# Patient Record
Sex: FEMALE | Race: BLACK OR AFRICAN AMERICAN | NOT HISPANIC OR LATINO | URBAN - METROPOLITAN AREA
[De-identification: names, ages, dates, MRNs, and addresses within clinical notes are randomized per-mention and may not be internally consistent; named-entity substitution may affect disease eponyms.]

---

## 2017-02-01 ENCOUNTER — OFFICE VISIT (OUTPATIENT)
Dept: OBSTETRICS AND GYNECOLOGY | Facility: CLINIC | Age: 62
End: 2017-02-01

## 2017-02-01 VITALS
WEIGHT: 123 LBS | HEIGHT: 63 IN | DIASTOLIC BLOOD PRESSURE: 80 MMHG | SYSTOLIC BLOOD PRESSURE: 118 MMHG | BODY MASS INDEX: 21.79 KG/M2

## 2017-02-01 DIAGNOSIS — Z12.4 PAP SMEAR FOR CERVICAL CANCER SCREENING: ICD-10-CM

## 2017-02-01 DIAGNOSIS — Z12.31 SCREENING MAMMOGRAM, ENCOUNTER FOR: ICD-10-CM

## 2017-02-01 DIAGNOSIS — Z13.9 SCREENING: Primary | ICD-10-CM

## 2017-02-01 PROBLEM — Z01.419 WELL FEMALE EXAM WITH ROUTINE GYNECOLOGICAL EXAM: Status: ACTIVE | Noted: 2017-02-01

## 2017-02-01 PROBLEM — N18.6 END-STAGE RENAL DISEASE (HCC): Status: ACTIVE | Noted: 2017-02-01

## 2017-02-01 PROCEDURE — G0101 CA SCREEN;PELVIC/BREAST EXAM: HCPCS | Performed by: OBSTETRICS & GYNECOLOGY

## 2017-02-01 RX ORDER — GLUCOSAMINE HCL 500 MG
2000 TABLET ORAL
COMMUNITY
End: 2017-02-01 | Stop reason: DRUGHIGH

## 2017-02-01 RX ORDER — AMLODIPINE BESYLATE 5 MG/1
TABLET ORAL
Refills: 5 | COMMUNITY
Start: 2016-12-10 | End: 2017-02-01 | Stop reason: DRUGHIGH

## 2017-02-01 RX ORDER — TERAZOSIN 1 MG/1
CAPSULE ORAL
COMMUNITY
Start: 2016-11-20 | End: 2017-02-01 | Stop reason: SDUPTHER

## 2017-02-01 RX ORDER — SERTRALINE HYDROCHLORIDE 25 MG/1
25 TABLET, FILM COATED ORAL
COMMUNITY
End: 2017-02-01 | Stop reason: SDUPTHER

## 2017-02-01 RX ORDER — HYDRALAZINE HYDROCHLORIDE 50 MG/1
50 TABLET, FILM COATED ORAL
COMMUNITY
End: 2019-04-11

## 2017-02-01 RX ORDER — PREDNISONE 10 MG/1
TABLET ORAL
COMMUNITY
Start: 2016-12-10 | End: 2017-02-01

## 2017-02-01 RX ORDER — NITROGLYCERIN 0.4 MG/1
TABLET SUBLINGUAL
COMMUNITY
End: 2017-02-01 | Stop reason: SDUPTHER

## 2017-02-01 RX ORDER — LISINOPRIL 40 MG/1
TABLET ORAL
COMMUNITY
Start: 2017-01-27 | End: 2022-09-21

## 2017-02-01 RX ORDER — IPRATROPIUM BROMIDE AND ALBUTEROL SULFATE 2.5; .5 MG/3ML; MG/3ML
3 SOLUTION RESPIRATORY (INHALATION)
COMMUNITY
Start: 2015-09-14

## 2017-02-01 RX ORDER — ROPINIROLE 0.5 MG/1
0.5 TABLET, FILM COATED ORAL
COMMUNITY
End: 2017-02-01

## 2017-02-01 RX ORDER — DEXAMETHASONE 2 MG/1
TABLET ORAL
COMMUNITY
Start: 2016-10-18 | End: 2019-04-11

## 2017-02-01 RX ORDER — LEVOTHYROXINE SODIUM 0.03 MG/1
25 TABLET ORAL
COMMUNITY
End: 2018-05-22

## 2017-02-01 RX ORDER — RENO CAPS 100; 1.5; 1.7; 20; 10; 1; 150; 5; 6 MG/1; MG/1; MG/1; MG/1; MG/1; MG/1; UG/1; MG/1; UG/1
CAPSULE ORAL
COMMUNITY
End: 2017-02-01 | Stop reason: SDUPTHER

## 2017-02-01 RX ORDER — CARVEDILOL 25 MG/1
TABLET ORAL
COMMUNITY
End: 2017-02-01 | Stop reason: SDUPTHER

## 2017-02-01 RX ORDER — ISOSORBIDE MONONITRATE 30 MG/1
30 TABLET, EXTENDED RELEASE ORAL
COMMUNITY
End: 2021-12-06

## 2017-02-01 RX ORDER — LOSARTAN POTASSIUM 25 MG/1
TABLET ORAL
COMMUNITY
Start: 2016-12-28

## 2017-02-01 RX ORDER — BUDESONIDE AND FORMOTEROL FUMARATE DIHYDRATE 160; 4.5 UG/1; UG/1
AEROSOL RESPIRATORY (INHALATION)
COMMUNITY
Start: 2016-11-28

## 2017-02-01 RX ORDER — LIDOCAINE AND PRILOCAINE 25; 25 MG/G; MG/G
CREAM TOPICAL
Refills: 5 | COMMUNITY
Start: 2017-01-16 | End: 2019-04-11

## 2017-02-01 RX ORDER — LOSARTAN POTASSIUM 50 MG/1
TABLET ORAL
Refills: 5 | COMMUNITY
Start: 2017-01-08 | End: 2017-02-01 | Stop reason: DRUGHIGH

## 2017-02-01 RX ORDER — CALCIUM ACETATE 667 MG/1
TABLET ORAL
COMMUNITY
End: 2017-02-01 | Stop reason: SDUPTHER

## 2017-02-01 RX ORDER — BUMETANIDE 2 MG/1
TABLET ORAL
COMMUNITY
End: 2017-02-01 | Stop reason: SDUPTHER

## 2017-02-01 RX ORDER — CLONIDINE HYDROCHLORIDE 0.2 MG/1
0.2 TABLET ORAL
COMMUNITY
End: 2017-02-01 | Stop reason: SDUPTHER

## 2017-02-01 RX ORDER — ONDANSETRON 4 MG/1
TABLET, FILM COATED ORAL
Refills: 0 | COMMUNITY
Start: 2016-12-13

## 2017-02-01 RX ORDER — LISINOPRIL 10 MG/1
TABLET ORAL
COMMUNITY
Start: 2016-10-04 | End: 2017-02-01 | Stop reason: DRUGHIGH

## 2017-02-01 RX ORDER — HYDRALAZINE HYDROCHLORIDE 100 MG/1
TABLET, FILM COATED ORAL
COMMUNITY
Start: 2017-01-24 | End: 2017-02-01 | Stop reason: SDUPTHER

## 2017-02-01 RX ORDER — AMLODIPINE BESYLATE 10 MG/1
10 TABLET ORAL
COMMUNITY
Start: 2015-09-14

## 2017-02-01 RX ORDER — ALBUTEROL SULFATE 2.5 MG/3ML
2.5 SOLUTION RESPIRATORY (INHALATION)
COMMUNITY
End: 2017-02-01 | Stop reason: SDUPTHER

## 2017-02-01 RX ORDER — MONTELUKAST SODIUM 10 MG/1
10 TABLET ORAL
COMMUNITY
End: 2017-02-01 | Stop reason: SDUPTHER

## 2017-02-01 NOTE — PROGRESS NOTES
[facility]  Routine Annual Visit    2017    Patient: Fanta Askew          MR#:[id number]      History of Present Illness    61 y.o. female  who presents for annual exam.    Patient presents for routine gynecologic exam.  She states she was seen by [facility] in [place] early January on  and had her Pap smear done.  She doesn't believe a breast exam was done and she's needing a mammogram scheduled.    Records were requested but not available at the time of this encounter.      No LMP recorded (exact date). Patient is postmenopausal.  Obstetric History:  OB History      Para Term  AB TAB SAB Ectopic Multiple Living    2 2 2       1         Menstrual History:  Menarche age: 22 years  No LMP recorded (exact date). Patient is postmenopausal.  Period Cycle (Days): 30  Period Duration (Days): 3  Period Pattern: Regular  Menstrual Flow: Light  Menstrual Control: Maxi pad    Sexual History:       ________________________________________  Patient Active Problem List   Diagnosis   • Hypothyroid   • Hypertension   • Renal disorder   • COPD (chronic obstructive pulmonary disease)   • CHF (congestive heart failure)       Past Medical History   Diagnosis Date   • CHF (congestive heart failure)    • COPD (chronic obstructive pulmonary disease)    • Hypertension    • Hypothyroid    • Renal disorder        History reviewed. No pertinent past surgical history.    History   Smoking Status   • Former Smoker   Smokeless Tobacco   • Not on file       Family History   Problem Relation Age of Onset   • Pancreatic cancer Father 81   • Lung cancer Mother 76       Prior to Admission medications    Medication Sig Start Date End Date Taking? Authorizing Provider   albuterol (PROVENTIL) (2.5 MG/3ML) 0.083% nebulizer solution Take 2.5 mg by nebulization Every 4 (Four) Hours As Needed for wheezing.   Yes Nurse [facility], RN   amLODIPine (NORVASC) 10 MG tablet Take 10 mg by mouth. 9/14/15   Yes Historical Provider, MD   amLODIPine-benazepril (LOTREL) 10-20 MG per capsule Take 1 capsule by mouth daily.   Yes Nurse [facility], RN   aspirin 81 MG tablet Take 81 mg by mouth.   Yes Nurse [facility], RN   B Complex-C-Folic Acid (VASILIY CAPS) 1 MG capsule Take  by mouth.   Yes [facility], RN   budesonide-formoterol (SYMBICORT) 160-4.5 MCG/ACT inhaler INHALE 2 PUFFS BY MOUTH 2 TIMES PER DAY 11/28/16  Yes Historical Provider, MD   bumetanide (BUMEX) 2 MG tablet Take 2 mg by mouth daily.   Yes Nurse [facility], RN   calcium acetate (PHOSLO) 667 MG tablet Take 667 mg by mouth.   Yes Nurse [facility], RN   carvedilol (COREG) 25 MG tablet Take 25 mg by mouth 2 (two) times a day with meals.   Yes Nurse [facility], RN   Cholecalciferol (VITAMIN D3) 2000 UNITS capsule TAKE 1 CAPSULE BY MOUTH DAILY 12/5/16  Yes Nurse [facility], RN   CloNIDine (CATAPRES) 0.2 MG tablet TAKE 1 TABLET BY MOUTH 3 TIMES A DAY 11/16/16  Yes Nurse [facility], RN   dexamethasone (DECADRON) 2 MG tablet 2 mg oral BID x 2 days then 2 mg oral daily x 2 days then stop 10/18/16  Yes Historical Provider, MD   fluticasone (FLONASE) 50 MCG/ACT nasal spray  12/5/16  Yes Nurse [facility], RN   hydrALAZINE (APRESOLINE) 50 MG tablet Take 50 mg by mouth.   Yes Historical Provider, MD   ipratropium-albuterol (DUO-NEB) 0.5-2.5 mg/mL nebulizer Inhale 3 mL. 9/14/15  Yes Historical Provider, MD   levothyroxine (SYNTHROID, LEVOTHROID) 25 MCG tablet Take 25 mcg by mouth.   Yes Historical Provider, MD   levothyroxine (SYNTHROID, LEVOTHROID) 50 MCG tablet Take 50 mcg by mouth daily.   Yes Nurse [facility], RN   lidocaine-prilocaine (EMLA) 2.5-2.5 % cream APPLY SMALL AMOUNT TO ACCESS SITE 1 TO 2 HOURS BEFORE DIALYSIS. COVER WITH OCCLUSIVE DRESSING (SARAN WRAP). 1/16/17  Yes Historical Provider, MD   lisinopril (PRINIVIL,ZESTRIL) 40 MG tablet  1/27/17  Yes Historical Provider, MD   losartan (COZAAR) 25 MG tablet  12/28/16   Yes Historical Provider, MD   montelukast (SINGULAIR) 10 MG tablet Take 10 mg by mouth every night.   Yes Nurse [facility], RN   nitroglycerin (NITROSTAT) 0.4 MG SL tablet Place 0.4 mg under the tongue every 5 (five) minutes as needed for chest pain. Take no more than 3 doses in 15 minutes.   Yes Nurse [facility], RN   ondansetron (ZOFRAN) 4 MG tablet TAKE 1 TABLET BY MOUTH EVERY 4 HOURS AS NEEDED FOR NAUSEA AND VOMITING 12/13/16  Yes Historical Provider, MD   SYMBICORT 160-4.5 MCG/ACT inhaler INHALE 2 PUFFS BY MOUTH 2 TIMES PER DAY 11/28/16  Yes Nurse [facility], RN   terazosin (HYTRIN) 1 MG capsule TAKE 1 CAPSULE BY MOUTH AT BEDTIME NIGHTLY 11/20/16  Yes Nurse [facility], RN   isosorbide mononitrate (IMDUR) 30 MG 24 hr tablet Take 30 mg by mouth.    Historical Provider, MD   sertraline (ZOLOFT) 25 MG tablet Take 25 mg by mouth daily.    Nurse [facility], RN   albuterol (PROVENTIL) (2.5 MG/3ML) 0.083% nebulizer solution Inhale 2.5 mg.  2/1/17  Historical Provider, MD   amLODIPine (NORVASC) 5 MG tablet TAKE 1 TABLET BY MOUTH EVERY DAY 12/10/16 2/1/17  Historical Provider, MD   aspirin 81 MG tablet Take 81 mg by mouth.  2/1/17  Historical Provider, MD   b complex-C-folic acid 1 MG capsule capsule Take  by mouth.  2/1/17  Historical Provider, MD   bumetanide (BUMEX) 2 MG tablet Take  by mouth.  2/1/17  Historical Provider, MD   calcium acetate (PHOSLO) 667 MG tablet Take  by mouth.  2/1/17  Historical Provider, MD   carvedilol (COREG) 25 MG tablet Take  by mouth.  2/1/17  Historical Provider, MD   Cholecalciferol (VITAMIN D3) 3000 UNITS tablet Take 2,000 Units by mouth.  2/1/17  Historical Provider, MD   CloNIDine (CATAPRES) 0.2 MG tablet Take 0.2 mg by mouth.  2/1/17  Historical Provider, MD   hydrALAZINE (APRESOLINE) 100 MG tablet  1/24/17 2/1/17  Historical Provider, MD   hydrALAZINE (APRESOLINE) 25 MG tablet Take 25 mg by mouth 3 (three) times a day.  2/1/17  Nurse [facility], RN    ipratropium (ATROVENT) 0.02 % nebulizer solution Inhale.  2/1/17  Historical Provider, MD   IPRATROPIUM BROMIDE IN Inhale.  2/1/17  Nurse [facility], RN   levoFLOXacin (LEVAQUIN) 750 MG tablet Take 1 tablet by mouth Daily. 12/10/16 2/1/17  HANG Botello   lisinopril (PRINIVIL,ZESTRIL) 10 MG tablet TAKE ONE TABLET EVERY NIGHT AT BED TIME 10/4/16 2/1/17  Nurse [facility], RN   lisinopril (PRINIVIL,ZESTRIL) 10 MG tablet TAKE ONE TABLET EVERY NIGHT AT BED TIME 10/4/16 2/1/17  Historical Provider, MD   Loratadine 10 MG capsule Take  by mouth.  2/1/17  Nurse [facility], RN   losartan (COZAAR) 50 MG tablet TAKE ONE TABLET BY MOUTH AT BEDTIME 1/8/17 2/1/17  Historical Provider, MD   montelukast (SINGULAIR) 10 MG tablet Take 10 mg by mouth.  2/1/17  Historical Provider, MD   nitroglycerin (NITROSTAT) 0.4 MG SL tablet Place  under the tongue.  2/1/17  Historical Provider, MD   PredniSONE (DELTASONE) 10 MG (48) tablet pack Take as directed on pack 12/10/16 2/1/17  HANG Botello   PredniSONE (DELTASONE) 10 MG (48) tablet pack Take as directed on pack 12/10/16 2/1/17  Historical Provider, MD   Pseudoephedrine-Acetaminophen (SM NON-ASPRIN SINUS PO) Take 81 mg by mouth.  2/1/17  Nurse [facility], RN   rOPINIRole (REQUIP) 0.5 MG tablet Take 0.5 mg by mouth every night. Take 1 hour before bedtime.  2/1/17  Nurse [facility], RN   rOPINIRole (REQUIP) 0.5 MG tablet Take 0.5 mg by mouth.  2/1/17  Historical Provider, MD   sertraline (ZOLOFT) 25 MG tablet Take 25 mg by mouth.  2/1/17  Historical Provider, MD   SPIRIVA RESPIMAT 2.5 MCG/ACT aerosol solution USE 1 PUFF DAILY 11/27/16 2/1/17  Nurse [facility], RN   terazosin (HYTRIN) 1 MG capsule TAKE 1 CAPSULE BY MOUTH AT BEDTIME NIGHTLY 11/20/16 2/1/17  Historical Provider, MD   Tiotropium Bromide Monohydrate (SPIRIVA RESPIMAT) 2.5 MCG/ACT aerosol solution USE 1 PUFF DAILY 11/27/16 2/1/17  Historical Provider, MD  "    ________________________________________    Current contraception: post menopausal status  History of abnormal Pap smear: no  Family history of uterine or ovarian cancer: no  Family History of colon cancer/colon polyps: no  History of abnormal mammogram: no  History of abnormal lipids: no    The following portions of the patient's history were reviewed and updated as appropriate: allergies, current medications, past family history, past medical history, past social history, past surgical history and problem list.    Review of Systems    Pertinent items are noted in HPI.     Objective   Physical Exam    Visit Vitals   • /80   • Ht 63\" (160 cm)   • Wt 123 lb (55.8 kg)   • LMP  (Exact Date)   • Breastfeeding No   • BMI 21.79 kg/m2      BP Readings from Last 3 Encounters:   02/01/17 118/80   12/29/16 (!) 178/102   12/10/16 142/82      Wt Readings from Last 3 Encounters:   02/01/17 123 lb (55.8 kg)   12/10/16 127 lb (57.6 kg)   03/14/16 130 lb (59 kg)      BMI: Estimated body mass index is 21.79 kg/(m^2) as calculated from the following:    Height as of this encounter: 63\" (160 cm).    Weight as of this encounter: 123 lb (55.8 kg).      General:   alert, appears stated age and cooperative   Heart: regular rate and rhythm, S1, S2 normal, no murmur, click, rub or gallop   Lungs: clear to auscultation bilaterally   Abdomen: soft, non-tender, without masses or organomegaly   Breast: inspection negative, no nipple discharge or bleeding, no masses or nodularity palpable   Vulva: Deferred   Vagina: deferred    Cervix: Deferred    Uterus: Deferred   Adnexa: Deferred     Patient refused pelvic exam.      Assessment:    1. Breast check  2.  Hypertension  3.  End-stage renal disease receiving dialysis  4.  History of cocaine abuse reportedly abstinent     Plan:    [x]  Records requested  [x]  Mammogram request made  []  PAP done  []  Occult fecal blood test ([facility])  []  Labs:   []  GC/Chl/TV  []  DEXA scan   []  Referral " for colonoscopy:   []  Encouraged/discussed daily ASA         Blaine Ace MD  2/1/2017 11:07 AM

## 2017-03-10 ENCOUNTER — TELEPHONE (OUTPATIENT)
Dept: OBSTETRICS AND GYNECOLOGY | Facility: CLINIC | Age: 62
End: 2017-03-10

## 2017-03-10 ENCOUNTER — DOCUMENTATION (OUTPATIENT)
Dept: OBSTETRICS AND GYNECOLOGY | Facility: CLINIC | Age: 62
End: 2017-03-10

## 2017-03-10 NOTE — TELEPHONE ENCOUNTER
3/10/2017      Patient: Fanta Askew   MR#:[id number]     Chart note     Call pt: Normal screening mammogram was reviewed.           Blaine Ace MD   3/10/2017 12:32 AM

## 2017-03-10 NOTE — PROGRESS NOTES
[facility]     3/10/2017      Patient:  Fanta Askew   MR#:[id number]    Chart note    Call pt:  Normal screening mammogram was reviewed.        Blaine Ace MD   3/10/2017 12:32 AM

## 2018-08-29 ENCOUNTER — OFFICE VISIT (OUTPATIENT)
Dept: GASTROENTEROLOGY | Facility: CLINIC | Age: 63
End: 2018-08-29

## 2018-08-29 ENCOUNTER — TELEPHONE (OUTPATIENT)
Dept: GASTROENTEROLOGY | Facility: CLINIC | Age: 63
End: 2018-08-29

## 2018-08-29 VITALS — HEIGHT: 62 IN | BODY MASS INDEX: 19.65 KG/M2 | TEMPERATURE: 98.6 F | WEIGHT: 106.8 LBS

## 2018-08-29 DIAGNOSIS — D64.9 ANEMIA, UNSPECIFIED TYPE: Primary | ICD-10-CM

## 2018-08-29 PROCEDURE — 99203 OFFICE O/P NEW LOW 30 MIN: CPT | Performed by: INTERNAL MEDICINE

## 2018-08-29 NOTE — TELEPHONE ENCOUNTER
----- Message from Rod Pate MD sent at 8/29/2018 11:26 AM EDT -----  Please request colonoscopy report from [facility] (should be in last year or two)

## 2018-08-29 NOTE — TELEPHONE ENCOUNTER
Called [facility] and spoke with medical records. Requested c/s and path results be faxed to [phone]. Records will be faxed ASAP.

## 2018-08-29 NOTE — PROGRESS NOTES
Chief Complaint   Patient presents with   • Anemia     Subjective      HPI     Fanta Askew is a 63 y.o. female who presents for evaluation of anemia. Pt with multiple medical problems, including HTN, COPD, CHF, ESRD on HD.  Reports Hb earlier this year in upper 7s.  Workup by her nephrologist including FOBT was negative.  MCV elevated.  Pt has received IV iron on dialysis.  Hb has been steadily increasing, now upper 9s.  She has not required transfusion.  She denies overt GI bleeding.  She has no GI complaints.  She reports normal colonoscopy 1yr ago at [facility].  I have requested those records.        Past Medical History:   Diagnosis Date   • CHF (congestive heart failure) (CMS/HCC)    • COPD (chronic obstructive pulmonary disease) (CMS/[facility])    • Hypertension    • Hypothyroid    • Renal disorder        Current Outpatient Prescriptions:   •  albuterol (PROVENTIL) (2.5 MG/3ML) 0.083% nebulizer solution, Take 2.5 mg by nebulization Every 4 (Four) Hours As Needed for wheezing., Disp: , Rfl:   •  amLODIPine (NORVASC) 10 MG tablet, Take 10 mg by mouth., Disp: , Rfl:   •  aspirin 81 MG tablet, Take 81 mg by mouth., Disp: , Rfl:   •  B Complex-C-Folic Acid (VASILIY CAPS) 1 MG capsule, Take  by mouth., Disp: , Rfl:   •  budesonide-formoterol (SYMBICORT) 160-4.5 MCG/ACT inhaler, INHALE 2 PUFFS BY MOUTH 2 TIMES PER DAY, Disp: , Rfl:   •  bumetanide (BUMEX) 2 MG tablet, Take 2 mg by mouth daily., Disp: , Rfl:   •  calcium acetate (PHOSLO) 667 MG tablet, Take 667 mg by mouth., Disp: , Rfl:   •  carvedilol (COREG) 25 MG tablet, Take 25 mg by mouth 2 (two) times a day with meals., Disp: , Rfl:   •  Cholecalciferol (VITAMIN D3) 2000 UNITS capsule, TAKE 1 CAPSULE BY MOUTH DAILY, Disp: , Rfl: 6  •  CloNIDine (CATAPRES) 0.2 MG tablet, TAKE 1 TABLET BY MOUTH 3 TIMES A DAY, Disp: , Rfl: 3  •  dexamethasone (DECADRON) 2 MG tablet, 2 mg oral BID x 2 days then 2 mg oral daily x 2 days then stop, Disp: , Rfl:   •  fluticasone  (FLONASE) 50 MCG/ACT nasal spray, , Disp: , Rfl:   •  hydrALAZINE (APRESOLINE) 50 MG tablet, Take 50 mg by mouth., Disp: , Rfl:   •  ipratropium-albuterol (DUO-NEB) 0.5-2.5 mg/mL nebulizer, Inhale 3 mL., Disp: , Rfl:   •  isosorbide mononitrate (IMDUR) 30 MG 24 hr tablet, Take 30 mg by mouth., Disp: , Rfl:   •  levothyroxine (SYNTHROID, LEVOTHROID) 50 MCG tablet, Take 50 mcg by mouth daily., Disp: , Rfl:   •  lidocaine-prilocaine (EMLA) 2.5-2.5 % cream, APPLY SMALL AMOUNT TO ACCESS SITE 1 TO 2 HOURS BEFORE DIALYSIS. COVER WITH OCCLUSIVE DRESSING (SARAN WRAP)., Disp: , Rfl: 5  •  lisinopril (PRINIVIL,ZESTRIL) 40 MG tablet, , Disp: , Rfl:   •  losartan (COZAAR) 25 MG tablet, , Disp: , Rfl:   •  montelukast (SINGULAIR) 10 MG tablet, Take 10 mg by mouth every night., Disp: , Rfl:   •  ondansetron (ZOFRAN) 4 MG tablet, TAKE 1 TABLET BY MOUTH EVERY 4 HOURS AS NEEDED FOR NAUSEA AND VOMITING, Disp: , Rfl: 0  •  sertraline (ZOLOFT) 25 MG tablet, Take 25 mg by mouth daily., Disp: , Rfl:   •  SYMBICORT 160-4.5 MCG/ACT inhaler, INHALE 2 PUFFS BY MOUTH 2 TIMES PER DAY, Disp: , Rfl: 6  •  terazosin (HYTRIN) 1 MG capsule, TAKE 1 CAPSULE BY MOUTH AT BEDTIME NIGHTLY, Disp: , Rfl: 1  •  amLODIPine-benazepril (LOTREL) 10-20 MG per capsule, Take 1 capsule by mouth daily., Disp: , Rfl:   •  amoxapine (ASENDIN) 100 MG tablet, Take 100 mg by mouth 2 (Two) Times a Day., Disp: , Rfl:   •  amoxicillin (AMOXIL) 500 MG capsule, Take 500 mg by mouth Daily., Disp: , Rfl:   •  nystatin (MYCOSTATIN) 776464 UNIT/ML suspension, Take 5 mL by mouth 4 (Four) Times a Day. Use for 3 weeks., Disp: 473 mL, Rfl: 0     Allergies   Allergen Reactions   • Hydrocodone-Homatropine Itching and Delirium     Social History     Social History   • Marital status: Unknown     Spouse name: N/A   • Number of children: N/A   • Years of education: N/A     Occupational History   • Not on file.     Social History Main Topics   • Smoking status: Former Smoker     Packs/day:  2.00     Types: Cigarettes     Quit date: 2013   • Smokeless tobacco: Never Used   • Alcohol use No   • Drug use: Yes     Types: Cocaine      Comment: sober since 2013   • Sexual activity: Yes     Partners: Male     Other Topics Concern   • Not on file     Social History Narrative   • No narrative on file     Family History   Problem Relation Age of Onset   • Pancreatic cancer Father 81   • Lung cancer Mother 76     Review of Systems   Constitutional: Negative.    Gastrointestinal: Negative.    All other systems reviewed and are negative.    Objective   Vitals:    08/29/18 1043   Temp: 98.6 °F (37 °C)     Physical Exam   Constitutional: She is oriented to person, place, and time. She appears well-developed and well-nourished.   HENT:   Head: Normocephalic and atraumatic.   Mouth/Throat: Oropharynx is clear and moist.   Eyes: EOM are normal. No scleral icterus.   Neck: Normal range of motion. Neck supple. No thyromegaly present.   Cardiovascular: Normal rate, regular rhythm and normal heart sounds.  Exam reveals no gallop and no friction rub.    No murmur heard.  Pulmonary/Chest: Effort normal and breath sounds normal. She has no wheezes. She has no rales. She exhibits no tenderness.   Abdominal: Soft. Bowel sounds are normal. She exhibits no distension. There is no tenderness. There is no rebound and no guarding. No hernia.   Musculoskeletal: Normal range of motion. She exhibits no edema.   Lymphadenopathy:     She has no cervical adenopathy.   Neurological: She is alert and oriented to person, place, and time.   Skin: Skin is warm and dry.   LUE AV fistula - palpable thrill   Psychiatric: She has a normal mood and affect. Judgment and thought content normal.   Vitals reviewed.    Assessment/Plan   Assessment:     1. Anemia, unspecified type    2.      ESRD on HD  3.      COPD  4.      CHF    Plan:   Pleasant lady with multiple medical problems presenting with anemia.  There is no evidence of overt or occult GI  bleeding.  She's apparently had recent colonoscopy within the last year which was normal.  Her hemoglobin has been improving her iron indices are not suggestive of iron deficiency.  I do not see a clear indication to subject her to repeat endoscopic evaluation at this time.  I have asked her to follow up if she should have any overt Gi bleeding or further decline in her Hb.          Rod Pate M.D.  [facility]  [address] [place] [address]  Office: [phone]

## 2018-11-19 ENCOUNTER — HOSPITAL ENCOUNTER (INPATIENT)
Facility: HOSPITAL | Age: 63
End: 2018-11-19
Attending: INTERNAL MEDICINE | Admitting: INTERNAL MEDICINE

## 2019-01-11 ENCOUNTER — ON CAMPUS - OUTPATIENT (OUTPATIENT)
Dept: URBAN - METROPOLITAN AREA HOSPITAL 108 | Facility: HOSPITAL | Age: 64
End: 2019-01-11

## 2019-01-11 DIAGNOSIS — K31.819 ANGIODYSPLASIA OF STOMACH AND DUODENUM WITHOUT BLEEDING: ICD-10-CM

## 2019-01-11 DIAGNOSIS — R93.3 ABNORMAL FINDINGS ON DIAGNOSTIC IMAGING OF OTHER PARTS OF DI: ICD-10-CM

## 2019-01-11 DIAGNOSIS — D50.0 IRON DEFICIENCY ANEMIA SECONDARY TO BLOOD LOSS (CHRONIC): ICD-10-CM

## 2019-01-11 DIAGNOSIS — K25.9 GASTRIC ULCER, UNSPECIFIED AS ACUTE OR CHRONIC, WITHOUT HEMO: ICD-10-CM

## 2019-01-11 DIAGNOSIS — K29.70 GASTRITIS, UNSPECIFIED, WITHOUT BLEEDING: ICD-10-CM

## 2019-01-11 PROCEDURE — 44369 SMALL BOWEL ENDOSCOPY: CPT

## 2023-12-09 PROBLEM — A49.8 OTHER BACTERIAL INFECTIONS OF UNSPECIFIED SITE: Status: ACTIVE | Noted: 2019-06-12

## 2023-12-09 PROBLEM — N18.6 ESRD ON DIALYSIS: Chronic | Status: ACTIVE | Noted: 2018-11-19

## 2023-12-09 PROBLEM — R06.00 DYSPNEA: Status: ACTIVE | Noted: 2020-01-04

## 2023-12-09 PROBLEM — J96.21 ACUTE ON CHRONIC RESPIRATORY FAILURE WITH HYPOXIA AND HYPERCAPNIA: Status: ACTIVE | Noted: 2019-06-01

## 2023-12-09 PROBLEM — Z99.2 ESRD ON DIALYSIS: Chronic | Status: ACTIVE | Noted: 2018-11-19

## 2023-12-09 PROBLEM — Q27.39: Status: ACTIVE | Noted: 2020-08-02

## 2023-12-09 PROBLEM — K31.819 ANGIODYSPLASIA OF DUODENUM: Status: ACTIVE | Noted: 2020-10-22

## 2023-12-09 PROBLEM — D64.9 ANEMIA: Status: ACTIVE | Noted: 2019-10-15

## 2023-12-09 PROBLEM — E83.51 HYPOCALCEMIA: Status: ACTIVE | Noted: 2018-03-15

## 2023-12-09 PROBLEM — R25.2 CRAMP AND SPASM: Status: ACTIVE | Noted: 2020-01-07

## 2023-12-09 PROBLEM — J96.22 ACUTE ON CHRONIC RESPIRATORY FAILURE WITH HYPOXIA AND HYPERCAPNIA: Status: ACTIVE | Noted: 2019-06-01

## 2023-12-09 PROBLEM — K08.9 DISORDER OF TEETH AND SUPPORTING STRUCTURES, UNSPECIFIED: Status: ACTIVE | Noted: 2017-06-26

## 2023-12-09 PROBLEM — I51.7 RIGHT VENTRICULAR DILATION: Status: ACTIVE | Noted: 2018-10-08

## 2023-12-09 PROBLEM — R25.1 TREMOR: Status: ACTIVE | Noted: 2017-03-29

## 2023-12-09 PROBLEM — I21.4 NSTEMI (NON-ST ELEVATED MYOCARDIAL INFARCTION): Status: ACTIVE | Noted: 2019-06-02

## 2023-12-09 PROBLEM — E55.9 VITAMIN D DEFICIENCY: Status: ACTIVE | Noted: 2017-03-29

## 2023-12-09 PROBLEM — R63.6 UNDERWEIGHT: Status: ACTIVE | Noted: 2021-05-26

## 2023-12-09 PROBLEM — R50.9 FEVER, UNSPECIFIED: Status: ACTIVE | Noted: 2023-02-03

## 2023-12-09 PROBLEM — I50.9 CONGESTIVE HEART FAILURE: Status: ACTIVE | Noted: 2023-12-09

## 2023-12-09 PROBLEM — E53.8 VITAMIN B12 DEFICIENCY: Status: ACTIVE | Noted: 2019-07-18

## 2023-12-09 PROBLEM — E87.5 HYPERKALEMIA: Status: ACTIVE | Noted: 2022-08-22

## 2023-12-09 PROBLEM — E03.9 HYPOTHYROIDISM: Status: ACTIVE | Noted: 2023-12-09

## 2023-12-09 PROBLEM — D17.9 LIPOMA: Status: ACTIVE | Noted: 2021-05-26

## 2023-12-09 PROBLEM — I95.3 HEMODIALYSIS-ASSOCIATED HYPOTENSION: Status: ACTIVE | Noted: 2020-01-07

## 2023-12-09 PROBLEM — E83.39 HYPERPHOSPHATEMIA: Status: ACTIVE | Noted: 2019-06-02

## 2023-12-09 PROBLEM — F41.0 PANIC ATTACK: Status: ACTIVE | Noted: 2020-06-17

## 2023-12-09 PROBLEM — D50.9 IRON DEFICIENCY ANEMIA: Status: ACTIVE | Noted: 2022-10-21

## 2023-12-09 PROBLEM — R22.41 SUBCUTANEOUS MASS OF RIGHT LOWER EXTREMITY: Status: ACTIVE | Noted: 2021-06-14

## 2023-12-09 PROBLEM — R22.42 SUBCUTANEOUS MASS OF LEFT LOWER EXTREMITY: Status: ACTIVE | Noted: 2021-06-14

## 2023-12-09 PROBLEM — R09.89 ABNORMAL PERIPHERAL PULSE: Status: ACTIVE | Noted: 2018-10-18

## 2023-12-09 PROBLEM — K31.811 ANGIODYSPLASIA OF STOMACH AND DUODENUM WITH BLEEDING: Status: ACTIVE | Noted: 2021-06-09

## 2023-12-09 PROBLEM — F41.9 ANXIETY DISORDER: Status: ACTIVE | Noted: 2023-12-09

## 2023-12-09 PROBLEM — J43.9 PULMONARY EMPHYSEMA: Status: ACTIVE | Noted: 2019-07-08

## 2023-12-09 PROBLEM — T78.40XA ALLERGY, UNSPECIFIED, INITIAL ENCOUNTER: Status: ACTIVE | Noted: 2020-12-02

## 2023-12-09 PROBLEM — R19.7 DIARRHEA: Status: ACTIVE | Noted: 2020-01-07

## 2023-12-09 PROBLEM — I42.9 CARDIOMYOPATHY: Chronic | Status: ACTIVE | Noted: 2019-06-06

## 2023-12-09 PROBLEM — R51.9 HEADACHE, UNSPECIFIED: Status: ACTIVE | Noted: 2023-02-03

## 2023-12-09 PROBLEM — K63.5 POLYP OF COLON: Status: ACTIVE | Noted: 2017-01-23

## 2023-12-09 PROBLEM — H54.7 WORSENING VISION: Status: ACTIVE | Noted: 2018-04-30

## 2023-12-09 PROBLEM — J18.9 PNEUMONIA DUE TO INFECTIOUS ORGANISM: Status: ACTIVE | Noted: 2019-11-27

## 2023-12-09 PROBLEM — I10 BENIGN HYPERTENSION: Status: ACTIVE | Noted: 2022-10-08

## 2023-12-09 PROBLEM — N28.9 DISORDER OF KIDNEY: Status: ACTIVE | Noted: 2021-06-09

## 2023-12-09 PROBLEM — N19 RENAL FAILURE SYNDROME: Status: ACTIVE | Noted: 2021-06-09

## 2023-12-09 PROBLEM — K92.1 MELENA: Status: ACTIVE | Noted: 2018-11-19

## 2023-12-09 PROBLEM — I50.22 CHRONIC SYSTOLIC CHF (CONGESTIVE HEART FAILURE): Status: ACTIVE | Noted: 2020-01-03

## 2023-12-09 PROBLEM — K21.9 ESOPHAGEAL REFLUX: Status: ACTIVE | Noted: 2017-03-29

## 2023-12-09 PROBLEM — R07.89 ATYPICAL CHEST PAIN: Status: ACTIVE | Noted: 2018-09-01

## 2023-12-09 PROBLEM — R11.2 INTRACTABLE NAUSEA AND VOMITING: Status: ACTIVE | Noted: 2018-11-20

## 2023-12-09 PROBLEM — J44.1 COPD EXACERBATION: Status: ACTIVE | Noted: 2019-09-17

## 2023-12-09 PROBLEM — T78.2XXA ANAPHYLACTIC SHOCK, UNSPECIFIED, INITIAL ENCOUNTER: Status: ACTIVE | Noted: 2020-12-02

## 2023-12-09 PROBLEM — K31.819 ANGIODYSPLASIA OF STOMACH: Status: ACTIVE | Noted: 2020-10-22

## 2024-03-07 DIAGNOSIS — N18.6 END STAGE RENAL DISEASE: Primary | ICD-10-CM

## 2024-04-03 PROBLEM — Z04.9 CONDITION NOT FOUND: Status: ACTIVE | Noted: 2024-04-03

## 2024-08-05 DIAGNOSIS — N18.6 ESRD (END STAGE RENAL DISEASE): Primary | ICD-10-CM

## 2024-08-15 ENCOUNTER — TELEPHONE (OUTPATIENT)
Age: 69
End: 2024-08-15

## 2024-08-15 NOTE — TELEPHONE ENCOUNTER
Pt called to reschedule due to transportation. Rescheduled to 8/29 at 9am for 8am arrival time. Can you schedule this? It's blocked out and I don't have access to override that.

## 2024-09-06 DIAGNOSIS — N18.6 ESRD (END STAGE RENAL DISEASE): Primary | ICD-10-CM

## 2024-09-10 ENCOUNTER — TELEPHONE (OUTPATIENT)
Age: 69
End: 2024-09-10
Payer: MEDICARE

## 2024-09-10 NOTE — TELEPHONE ENCOUNTER
Left voicemail with pt regarding appt moved from 9/12/2024 to 9/17/2024 at 1000 am and pt to arrive at 0900